# Patient Record
Sex: MALE | Race: WHITE | Employment: UNEMPLOYED | URBAN - NONMETROPOLITAN AREA
[De-identification: names, ages, dates, MRNs, and addresses within clinical notes are randomized per-mention and may not be internally consistent; named-entity substitution may affect disease eponyms.]

---

## 2019-08-07 ENCOUNTER — OFFICE VISIT (OUTPATIENT)
Dept: SURGERY | Age: 54
End: 2019-08-07
Payer: COMMERCIAL

## 2019-08-07 VITALS
SYSTOLIC BLOOD PRESSURE: 138 MMHG | RESPIRATION RATE: 20 BRPM | OXYGEN SATURATION: 99 % | HEIGHT: 74 IN | BODY MASS INDEX: 36.32 KG/M2 | HEART RATE: 60 BPM | WEIGHT: 283 LBS | TEMPERATURE: 96 F | DIASTOLIC BLOOD PRESSURE: 82 MMHG

## 2019-08-07 DIAGNOSIS — I10 ESSENTIAL HYPERTENSION: ICD-10-CM

## 2019-08-07 DIAGNOSIS — K42.9 UMBILICAL HERNIA WITHOUT OBSTRUCTION AND WITHOUT GANGRENE: Primary | ICD-10-CM

## 2019-08-07 DIAGNOSIS — Z01.818 PRE-OP TESTING: ICD-10-CM

## 2019-08-07 PROCEDURE — 3017F COLORECTAL CA SCREEN DOC REV: CPT | Performed by: SURGERY

## 2019-08-07 PROCEDURE — 99203 OFFICE O/P NEW LOW 30 MIN: CPT | Performed by: SURGERY

## 2019-08-07 PROCEDURE — G8417 CALC BMI ABV UP PARAM F/U: HCPCS | Performed by: SURGERY

## 2019-08-07 PROCEDURE — 1036F TOBACCO NON-USER: CPT | Performed by: SURGERY

## 2019-08-07 PROCEDURE — G8427 DOCREV CUR MEDS BY ELIG CLIN: HCPCS | Performed by: SURGERY

## 2019-08-07 RX ORDER — CITALOPRAM 20 MG/1
20 TABLET ORAL DAILY
COMMUNITY
Start: 2018-10-16

## 2019-08-07 RX ORDER — METFORMIN HYDROCHLORIDE 500 MG/1
500 TABLET, EXTENDED RELEASE ORAL DAILY
COMMUNITY
Start: 2018-10-29

## 2019-08-07 RX ORDER — ATORVASTATIN CALCIUM 20 MG/1
20 TABLET, FILM COATED ORAL NIGHTLY
COMMUNITY
Start: 2018-08-23

## 2019-08-07 RX ORDER — LISINOPRIL 5 MG/1
5 TABLET ORAL DAILY
COMMUNITY
Start: 2019-05-08

## 2019-08-08 ASSESSMENT — ENCOUNTER SYMPTOMS
ABDOMINAL PAIN: 0
ANAL BLEEDING: 0
NAUSEA: 1
PHOTOPHOBIA: 0
VOICE CHANGE: 0
EYE REDNESS: 0
EYE PAIN: 0
TROUBLE SWALLOWING: 1
STRIDOR: 0
CHEST TIGHTNESS: 0
APNEA: 0
CONSTIPATION: 0
WHEEZING: 0
RECTAL PAIN: 0
ALLERGIC/IMMUNOLOGIC NEGATIVE: 1
VOMITING: 0
SORE THROAT: 0
SINUS PRESSURE: 0
RHINORRHEA: 0
SHORTNESS OF BREATH: 0
BLOOD IN STOOL: 0
BACK PAIN: 1
EYE ITCHING: 0
DIARRHEA: 1
COLOR CHANGE: 0
EYE DISCHARGE: 0
ABDOMINAL DISTENTION: 0
CHOKING: 0
FACIAL SWELLING: 0
COUGH: 0

## 2019-08-21 NOTE — H&P
release tablet Take 500 mg by mouth daily        lisinopril (PRINIVIL;ZESTRIL) 5 MG tablet Take 5 mg by mouth daily          No current facility-administered medications for this visit.                   Allergies   Allergen Reactions    Bee Venom      Clindamycin/Lincomycin Other (See Comments)       dizzyness    Phenergan [Promethazine] Itching         Family History         Family History   Problem Relation Age of Onset    Diabetes Mother      Kidney Disease Mother      Diabetes Father      Kidney Disease Father      Colon Cancer Father      Diabetes Sister      Diabetes Sister              Social History               Socioeconomic History    Marital status:        Spouse name: Not on file    Number of children: Not on file    Years of education: Not on file    Highest education level: Not on file   Occupational History    Not on file   Social Needs    Financial resource strain: Not on file    Food insecurity:       Worry: Not on file       Inability: Not on file    Transportation needs:       Medical: Not on file       Non-medical: Not on file   Tobacco Use    Smoking status: Never Smoker    Smokeless tobacco: Never Used   Substance and Sexual Activity    Alcohol use:  Yes       Comment: occasionally    Drug use: Never    Sexual activity: Not on file   Lifestyle    Physical activity:       Days per week: Not on file       Minutes per session: Not on file    Stress: Not on file   Relationships    Social connections:       Talks on phone: Not on file       Gets together: Not on file       Attends [employer] service: Not on file       Active member of club or organization: Not on file       Attends meetings of clubs or organizations: Not on file       Relationship status: Not on file    Intimate partner violence:       Fear of current or ex partner: Not on file       Emotionally abused: Not on file       Physically abused: Not on file       Forced sexual activity: Not on file

## 2019-08-22 ENCOUNTER — ANESTHESIA EVENT (OUTPATIENT)
Dept: OPERATING ROOM | Age: 54
End: 2019-08-22
Payer: COMMERCIAL

## 2019-08-22 ENCOUNTER — HOSPITAL ENCOUNTER (OUTPATIENT)
Age: 54
Setting detail: OUTPATIENT SURGERY
Discharge: HOME | End: 2019-08-22
Attending: SURGERY | Admitting: SURGERY
Payer: COMMERCIAL

## 2019-08-22 ENCOUNTER — ANESTHESIA (OUTPATIENT)
Dept: OPERATING ROOM | Age: 54
End: 2019-08-22
Payer: COMMERCIAL

## 2019-08-22 VITALS
BODY MASS INDEX: 35.99 KG/M2 | TEMPERATURE: 97.2 F | HEIGHT: 74 IN | OXYGEN SATURATION: 95 % | HEART RATE: 79 BPM | SYSTOLIC BLOOD PRESSURE: 143 MMHG | DIASTOLIC BLOOD PRESSURE: 79 MMHG | WEIGHT: 280.4 LBS | RESPIRATION RATE: 15 BRPM

## 2019-08-22 VITALS
RESPIRATION RATE: 1 BRPM | SYSTOLIC BLOOD PRESSURE: 93 MMHG | OXYGEN SATURATION: 81 % | DIASTOLIC BLOOD PRESSURE: 50 MMHG

## 2019-08-22 DIAGNOSIS — K42.0 INCARCERATED UMBILICAL HERNIA: Primary | ICD-10-CM

## 2019-08-22 LAB
GLUCOSE BLD-MCNC: 108 MG/DL (ref 70–108)
GLUCOSE BLD-MCNC: 117 MG/DL (ref 70–108)
POC POTASSIUM, WHOLE BLOOD: 3.9 MEQ/L (ref 3.5–4.9)

## 2019-08-22 PROCEDURE — 82948 REAGENT STRIP/BLOOD GLUCOSE: CPT

## 2019-08-22 PROCEDURE — 2500000003 HC RX 250 WO HCPCS: Performed by: SURGERY

## 2019-08-22 PROCEDURE — C1781 MESH (IMPLANTABLE): HCPCS | Performed by: SURGERY

## 2019-08-22 PROCEDURE — 7100000010 HC PHASE II RECOVERY - FIRST 15 MIN: Performed by: SURGERY

## 2019-08-22 PROCEDURE — 7100000000 HC PACU RECOVERY - FIRST 15 MIN: Performed by: SURGERY

## 2019-08-22 PROCEDURE — 2500000003 HC RX 250 WO HCPCS: Performed by: NURSE ANESTHETIST, CERTIFIED REGISTERED

## 2019-08-22 PROCEDURE — 84132 ASSAY OF SERUM POTASSIUM: CPT

## 2019-08-22 PROCEDURE — 3700000000 HC ANESTHESIA ATTENDED CARE: Performed by: SURGERY

## 2019-08-22 PROCEDURE — 7100000001 HC PACU RECOVERY - ADDTL 15 MIN: Performed by: SURGERY

## 2019-08-22 PROCEDURE — 3700000001 HC ADD 15 MINUTES (ANESTHESIA): Performed by: SURGERY

## 2019-08-22 PROCEDURE — 6360000002 HC RX W HCPCS: Performed by: ANESTHESIOLOGY

## 2019-08-22 PROCEDURE — 2709999900 HC NON-CHARGEABLE SUPPLY: Performed by: SURGERY

## 2019-08-22 PROCEDURE — 6360000002 HC RX W HCPCS: Performed by: SURGERY

## 2019-08-22 PROCEDURE — 3600000012 HC SURGERY LEVEL 2 ADDTL 15MIN: Performed by: SURGERY

## 2019-08-22 PROCEDURE — 6360000002 HC RX W HCPCS: Performed by: NURSE ANESTHETIST, CERTIFIED REGISTERED

## 2019-08-22 PROCEDURE — 3600000002 HC SURGERY LEVEL 2 BASE: Performed by: SURGERY

## 2019-08-22 PROCEDURE — 49587 REPAIR UMBILICAL HERN,5+Y/O,STRANG: CPT | Performed by: SURGERY

## 2019-08-22 PROCEDURE — 2580000003 HC RX 258: Performed by: SURGERY

## 2019-08-22 PROCEDURE — 7100000011 HC PHASE II RECOVERY - ADDTL 15 MIN: Performed by: SURGERY

## 2019-08-22 DEVICE — IMPLANTABLE DEVICE: Type: IMPLANTABLE DEVICE | Site: ABDOMEN | Status: FUNCTIONAL

## 2019-08-22 RX ORDER — SODIUM CHLORIDE 9 MG/ML
INJECTION, SOLUTION INTRAVENOUS CONTINUOUS
Status: DISCONTINUED | OUTPATIENT
Start: 2019-08-22 | End: 2019-08-22 | Stop reason: HOSPADM

## 2019-08-22 RX ORDER — OXYCODONE HYDROCHLORIDE 5 MG/1
10 TABLET ORAL EVERY 4 HOURS PRN
Status: DISCONTINUED | OUTPATIENT
Start: 2019-08-22 | End: 2019-08-22 | Stop reason: HOSPADM

## 2019-08-22 RX ORDER — FENTANYL CITRATE 50 UG/ML
50 INJECTION, SOLUTION INTRAMUSCULAR; INTRAVENOUS EVERY 5 MIN PRN
Status: DISCONTINUED | OUTPATIENT
Start: 2019-08-22 | End: 2019-08-22 | Stop reason: HOSPADM

## 2019-08-22 RX ORDER — ONDANSETRON 2 MG/ML
INJECTION INTRAMUSCULAR; INTRAVENOUS PRN
Status: DISCONTINUED | OUTPATIENT
Start: 2019-08-22 | End: 2019-08-22 | Stop reason: SDUPTHER

## 2019-08-22 RX ORDER — KETOROLAC TROMETHAMINE 10 MG/1
10 TABLET, FILM COATED ORAL EVERY 8 HOURS PRN
Qty: 15 TABLET | Refills: 0 | Status: SHIPPED | OUTPATIENT
Start: 2019-08-22

## 2019-08-22 RX ORDER — GLYCOPYRROLATE 1 MG/5 ML
SYRINGE (ML) INTRAVENOUS PRN
Status: DISCONTINUED | OUTPATIENT
Start: 2019-08-22 | End: 2019-08-22 | Stop reason: SDUPTHER

## 2019-08-22 RX ORDER — FENTANYL CITRATE 50 UG/ML
INJECTION, SOLUTION INTRAMUSCULAR; INTRAVENOUS PRN
Status: DISCONTINUED | OUTPATIENT
Start: 2019-08-22 | End: 2019-08-22 | Stop reason: SDUPTHER

## 2019-08-22 RX ORDER — LIDOCAINE HYDROCHLORIDE 20 MG/ML
INJECTION, SOLUTION EPIDURAL; INFILTRATION; INTRACAUDAL; PERINEURAL PRN
Status: DISCONTINUED | OUTPATIENT
Start: 2019-08-22 | End: 2019-08-22 | Stop reason: SDUPTHER

## 2019-08-22 RX ORDER — PROPOFOL 10 MG/ML
INJECTION, EMULSION INTRAVENOUS PRN
Status: DISCONTINUED | OUTPATIENT
Start: 2019-08-22 | End: 2019-08-22 | Stop reason: SDUPTHER

## 2019-08-22 RX ORDER — SODIUM CHLORIDE 0.9 % (FLUSH) 0.9 %
10 SYRINGE (ML) INJECTION EVERY 12 HOURS SCHEDULED
Status: DISCONTINUED | OUTPATIENT
Start: 2019-08-22 | End: 2019-08-22 | Stop reason: HOSPADM

## 2019-08-22 RX ORDER — MIDAZOLAM HYDROCHLORIDE 1 MG/ML
INJECTION INTRAMUSCULAR; INTRAVENOUS PRN
Status: DISCONTINUED | OUTPATIENT
Start: 2019-08-22 | End: 2019-08-22 | Stop reason: SDUPTHER

## 2019-08-22 RX ORDER — DEXAMETHASONE SODIUM PHOSPHATE 4 MG/ML
INJECTION, SOLUTION INTRA-ARTICULAR; INTRALESIONAL; INTRAMUSCULAR; INTRAVENOUS; SOFT TISSUE PRN
Status: DISCONTINUED | OUTPATIENT
Start: 2019-08-22 | End: 2019-08-22 | Stop reason: SDUPTHER

## 2019-08-22 RX ORDER — CEFAZOLIN SODIUM 1 G/50ML
1 INJECTION, SOLUTION INTRAVENOUS
Status: COMPLETED | OUTPATIENT
Start: 2019-08-22 | End: 2019-08-22

## 2019-08-22 RX ORDER — MORPHINE SULFATE 2 MG/ML
4 INJECTION, SOLUTION INTRAMUSCULAR; INTRAVENOUS
Status: DISCONTINUED | OUTPATIENT
Start: 2019-08-22 | End: 2019-08-22 | Stop reason: HOSPADM

## 2019-08-22 RX ORDER — OXYCODONE HYDROCHLORIDE 5 MG/1
5 TABLET ORAL EVERY 4 HOURS PRN
Status: DISCONTINUED | OUTPATIENT
Start: 2019-08-22 | End: 2019-08-22 | Stop reason: HOSPADM

## 2019-08-22 RX ORDER — MORPHINE SULFATE 2 MG/ML
2 INJECTION, SOLUTION INTRAMUSCULAR; INTRAVENOUS
Status: DISCONTINUED | OUTPATIENT
Start: 2019-08-22 | End: 2019-08-22 | Stop reason: HOSPADM

## 2019-08-22 RX ORDER — LABETALOL 20 MG/4 ML (5 MG/ML) INTRAVENOUS SYRINGE
5 EVERY 10 MIN PRN
Status: DISCONTINUED | OUTPATIENT
Start: 2019-08-22 | End: 2019-08-22 | Stop reason: HOSPADM

## 2019-08-22 RX ORDER — MEPERIDINE HYDROCHLORIDE 25 MG/ML
12.5 INJECTION INTRAMUSCULAR; INTRAVENOUS; SUBCUTANEOUS EVERY 5 MIN PRN
Status: DISCONTINUED | OUTPATIENT
Start: 2019-08-22 | End: 2019-08-22 | Stop reason: HOSPADM

## 2019-08-22 RX ORDER — BUPIVACAINE HYDROCHLORIDE 5 MG/ML
INJECTION, SOLUTION EPIDURAL; INTRACAUDAL PRN
Status: DISCONTINUED | OUTPATIENT
Start: 2019-08-22 | End: 2019-08-22 | Stop reason: ALTCHOICE

## 2019-08-22 RX ORDER — ONDANSETRON 2 MG/ML
4 INJECTION INTRAMUSCULAR; INTRAVENOUS
Status: DISCONTINUED | OUTPATIENT
Start: 2019-08-22 | End: 2019-08-22 | Stop reason: HOSPADM

## 2019-08-22 RX ORDER — ONDANSETRON 2 MG/ML
4 INJECTION INTRAMUSCULAR; INTRAVENOUS EVERY 6 HOURS PRN
Status: DISCONTINUED | OUTPATIENT
Start: 2019-08-22 | End: 2019-08-22 | Stop reason: HOSPADM

## 2019-08-22 RX ORDER — KETOROLAC TROMETHAMINE 30 MG/ML
INJECTION, SOLUTION INTRAMUSCULAR; INTRAVENOUS PRN
Status: DISCONTINUED | OUTPATIENT
Start: 2019-08-22 | End: 2019-08-22 | Stop reason: SDUPTHER

## 2019-08-22 RX ORDER — HYDROCODONE BITARTRATE AND ACETAMINOPHEN 5; 325 MG/1; MG/1
1-2 TABLET ORAL EVERY 6 HOURS PRN
Qty: 25 TABLET | Refills: 0 | Status: SHIPPED | OUTPATIENT
Start: 2019-08-22 | End: 2019-08-25

## 2019-08-22 RX ORDER — SODIUM CHLORIDE 0.9 % (FLUSH) 0.9 %
10 SYRINGE (ML) INJECTION PRN
Status: DISCONTINUED | OUTPATIENT
Start: 2019-08-22 | End: 2019-08-22 | Stop reason: HOSPADM

## 2019-08-22 RX ADMIN — PROPOFOL 200 MG: 10 INJECTION, EMULSION INTRAVENOUS at 09:01

## 2019-08-22 RX ADMIN — HYDROMORPHONE HYDROCHLORIDE 0.25 MG: 1 INJECTION, SOLUTION INTRAMUSCULAR; INTRAVENOUS; SUBCUTANEOUS at 10:13

## 2019-08-22 RX ADMIN — FENTANYL CITRATE 50 MCG: 50 INJECTION INTRAMUSCULAR; INTRAVENOUS at 09:16

## 2019-08-22 RX ADMIN — LIDOCAINE HYDROCHLORIDE 100 MG: 20 INJECTION, SOLUTION EPIDURAL; INFILTRATION; INTRACAUDAL; PERINEURAL at 09:01

## 2019-08-22 RX ADMIN — CEFAZOLIN SODIUM 2 G: 1 INJECTION, SOLUTION INTRAVENOUS at 09:09

## 2019-08-22 RX ADMIN — MIDAZOLAM HYDROCHLORIDE 2 MG: 1 INJECTION, SOLUTION INTRAMUSCULAR; INTRAVENOUS at 08:59

## 2019-08-22 RX ADMIN — ONDANSETRON HYDROCHLORIDE 4 MG: 4 INJECTION, SOLUTION INTRAMUSCULAR; INTRAVENOUS at 09:01

## 2019-08-22 RX ADMIN — HYDROMORPHONE HYDROCHLORIDE 0.25 MG: 1 INJECTION, SOLUTION INTRAMUSCULAR; INTRAVENOUS; SUBCUTANEOUS at 10:07

## 2019-08-22 RX ADMIN — FENTANYL CITRATE 50 MCG: 50 INJECTION INTRAMUSCULAR; INTRAVENOUS at 09:27

## 2019-08-22 RX ADMIN — Medication 0.2 MG: at 09:01

## 2019-08-22 RX ADMIN — KETOROLAC TROMETHAMINE 30 MG: 30 INJECTION, SOLUTION INTRAMUSCULAR; INTRAVENOUS at 09:30

## 2019-08-22 RX ADMIN — SODIUM CHLORIDE: 9 INJECTION, SOLUTION INTRAVENOUS at 08:59

## 2019-08-22 RX ADMIN — FENTANYL CITRATE 100 MCG: 50 INJECTION INTRAMUSCULAR; INTRAVENOUS at 09:01

## 2019-08-22 RX ADMIN — DEXAMETHASONE SODIUM PHOSPHATE 10 MG: 4 INJECTION, SOLUTION INTRAMUSCULAR; INTRAVENOUS at 09:01

## 2019-08-22 ASSESSMENT — PULMONARY FUNCTION TESTS
PIF_VALUE: 2
PIF_VALUE: 4
PIF_VALUE: 2
PIF_VALUE: 15
PIF_VALUE: 16
PIF_VALUE: 4
PIF_VALUE: 3
PIF_VALUE: 2
PIF_VALUE: 14
PIF_VALUE: 3
PIF_VALUE: 15
PIF_VALUE: 15
PIF_VALUE: 2
PIF_VALUE: 2
PIF_VALUE: 1
PIF_VALUE: 2
PIF_VALUE: 2
PIF_VALUE: 3
PIF_VALUE: 1
PIF_VALUE: 1
PIF_VALUE: 16
PIF_VALUE: 3
PIF_VALUE: 16
PIF_VALUE: 32
PIF_VALUE: 15
PIF_VALUE: 2
PIF_VALUE: 1
PIF_VALUE: 2
PIF_VALUE: 1
PIF_VALUE: 4
PIF_VALUE: 16
PIF_VALUE: 32
PIF_VALUE: 2
PIF_VALUE: 15
PIF_VALUE: 2
PIF_VALUE: 2
PIF_VALUE: 15
PIF_VALUE: 2
PIF_VALUE: 2
PIF_VALUE: 3
PIF_VALUE: 2
PIF_VALUE: 2
PIF_VALUE: 1
PIF_VALUE: 1
PIF_VALUE: 2
PIF_VALUE: 2

## 2019-08-22 ASSESSMENT — PAIN - FUNCTIONAL ASSESSMENT: PAIN_FUNCTIONAL_ASSESSMENT: 0-10

## 2019-08-22 ASSESSMENT — PAIN SCALES - GENERAL
PAINLEVEL_OUTOF10: 5
PAINLEVEL_OUTOF10: 5

## 2019-08-22 ASSESSMENT — PAIN SCALES - WONG BAKER: WONGBAKER_NUMERICALRESPONSE: 0

## 2019-08-22 NOTE — BRIEF OP NOTE
Brief Postoperative Note  ______________________________________________________________    Patient: Prosper Phipps  YOB: 1965  MRN: [id number]  Date of Procedure: 8/22/2019    Pre-Op Diagnosis: INCARCERATED UMBILICAL HERNIA    Post-Op Diagnosis: Same       Procedure(s):  INCARCERATED UMBILICAL HERNIA REPAIR WITH MESH    Anesthesia: general/local    Surgeon(s):  Allison Saunders MD    Assistant: none    Estimated Blood Loss (mL): 10 ml    Complications: None    Specimens:   * No specimens in log *    Implants:  Implant Name Type Inv.  Item Serial No.  Lot No. LRB No. Used   PATCH JCARLOS VENTRALEX ST W/STRAP CIR SM 4.3CM Mesh PATCH JCARLOS VENTRALEX ST W/STRAP CIR SM 4.3CM  CR [employer] [id number] N/A 1         Drains: * No LDAs found *    Findings: as above    Allison Saunders MD  Date: 8/22/2019  Time: 9:47 AM

## 2019-08-22 NOTE — INTERVAL H&P NOTE
H&P Update    Patient's History and Physical from August was reviewed. Patient examined. There has been no change.     Electronically signed by Silver Mcintyre MD on 8/22/19 at 9:47 AM

## 2019-08-23 ENCOUNTER — TELEPHONE (OUTPATIENT)
Dept: SURGERY | Age: 54
End: 2019-08-23

## 2019-08-23 NOTE — TELEPHONE ENCOUNTER
Called to check on patient post op. NA, LM advising patient to give the office a call with any questions or concerns

## 2019-08-23 NOTE — OP NOTE
[address] [place] [address]                                OPERATIVE REPORT    PATIENT NAME: Aislinn Zapata                   :        1965  MED REC NO:   [id number]                           ROOM:  ACCOUNT NO:   [de-identified]                           ADMIT DATE: 2019  PROVIDER:     David Santana M.D.    Eyad Manriquez OF PROCEDURE:  2019    PREOPERATIVE DIAGNOSIS:  Incarcerated umbilical hernia. POSTOPERATIVE DIAGNOSIS:  Incarcerated umbilical hernia. PROCEDURE:  Repair of incarcerated umbilical hernia with mesh (small  Ventralex mesh). SURGEON:  David Santana MD.    ANESTHESIA:  General/local.    ESTIMATED BLOOD LOSS:  10 mL. DRAINS:  None. COMPLICATIONS:  None. DISPOSITION:  Stable to the recovery room. INDICATIONS:  The patient is a 14-year-old gentleman who I had seen in  the office secondary to periumbilical bulge with discomfort. Found to  have an incarcerated umbilical hernia. Both operative and nonoperative  intervention plans were discussed. He understood and wished to proceed  with surgical intervention. The risks of surgery were then further  discussed. Some of the risks included, but were not limited to  bleeding, infection, the need for reoperation, severe chronic  postoperative pain or numbness, major vascular or nerve injury,  cardiopulmonary complications, anesthetic complications, seroma/hematoma  formation, wound breakdown, trocar site herniation, mesh infection  requiring the removal of the mesh, recurrence of the hernia, chronic  groin pain, and death. After all of the questions were answered in  their entirety and the patient was completely aware of the current  situation, he elected to proceed with the procedure.     DESCRIPTION OF THE PROCEDURE:  After informed consent was signed and  placed on the chart, the patient was taken back to the operating room  and placed supine on

## 2019-09-09 ENCOUNTER — OFFICE VISIT (OUTPATIENT)
Dept: SURGERY | Age: 54
End: 2019-09-09

## 2019-09-09 VITALS
OXYGEN SATURATION: 98 % | HEIGHT: 74 IN | WEIGHT: 280 LBS | TEMPERATURE: 96.5 F | HEART RATE: 62 BPM | RESPIRATION RATE: 18 BRPM | DIASTOLIC BLOOD PRESSURE: 80 MMHG | SYSTOLIC BLOOD PRESSURE: 142 MMHG | BODY MASS INDEX: 35.94 KG/M2

## 2019-09-09 DIAGNOSIS — Z09 S/P UMBILICAL HERNIA REPAIR, FOLLOW-UP EXAM: Primary | ICD-10-CM

## 2019-09-09 PROCEDURE — 99024 POSTOP FOLLOW-UP VISIT: CPT | Performed by: NURSE PRACTITIONER

## 2019-09-09 ASSESSMENT — ENCOUNTER SYMPTOMS
EYE DISCHARGE: 0
CHEST TIGHTNESS: 0
COLOR CHANGE: 0
ANAL BLEEDING: 0
CONSTIPATION: 0
BLOOD IN STOOL: 0
BACK PAIN: 0
STRIDOR: 0
EYE ITCHING: 0
APNEA: 0
COUGH: 0
SORE THROAT: 0
ABDOMINAL PAIN: 0
DIARRHEA: 0
VOMITING: 0
ABDOMINAL DISTENTION: 0
WHEEZING: 0
FACIAL SWELLING: 0
SINUS PRESSURE: 0
EYE PAIN: 0
RECTAL PAIN: 0
TROUBLE SWALLOWING: 0
CHOKING: 0
PHOTOPHOBIA: 0
VOICE CHANGE: 0
EYE REDNESS: 0
SHORTNESS OF BREATH: 0
RHINORRHEA: 0

## 2019-09-09 NOTE — PROGRESS NOTES
[address] [facility] [address] [facility] [address]  Dept: [phone]  Dept Fax: [phone]  Loc: [phone]    Visit Date: 9/9/2019    Valentina Ibarra is a 46 y.o. male who presents today for:  Chief Complaint   Patient presents with    Post-Op Check     s/p Repair of incarcerated umbilical hernia with mesh 8/22/19       HPI:     HPI    Ashlyn Menchaca is a 54-year old male patient who presents today for follow up status post repair of umbilical hernia with mesh 2 weeks ago with Dr. Selma Singh. Off all narcotics. Denies any abdominal/surgical pain. Appetite back to normal. No nausea or vomiting. No fever, chills, or sweats. Incision healing well without any signs of infection. A little tender. Normal induration. No issues urinating. Bowels are back to normal. No stool softener use. No SOB or chest pain. No lightheadedness or dizziness. Tolerating activity well.     Past Medical History:   Diagnosis Date    Diabetes mellitus ([address]     BORDERLINE    Hernia of abdominal wall     Hypercholesteremia     Hypertension     RADHA on CPAP     PONV (postoperative nausea and vomiting)     Pre-diabetes     Sleep apnea     wears cpap      Past Surgical History:   Procedure Laterality Date    [place]    BACK SURGERY  02[facility]2019    [facility]   Tivis Abelson CERVICAL SPINE SURGERY  05/2017    [place]    BERTRAND Andrade  [address] [place] [address]    COLONOSCOPY      due in 2020   [address] SURGERY  12/2015    [place]    [facility]    OTHER SURGICAL HISTORY Right 04/2018    I&D rotator cuff x2, [place]    [facility]    ROTATOR CUFF REPAIR Right 03/2018    [facility]    UMBILICAL HERNIA REPAIR N/A 2/06/3580    UMBILICAL HERNIA REPAIR POSS MESH performed by Herbert Fernandez MD at [address]       Family History   Problem Relation Age of Onset  Diabetes Mother     Kidney Disease Mother     Diabetes Father     Kidney Disease Father     Colon Cancer Father     Diabetes Sister     Diabetes Sister        Social History     Tobacco Use    Smoking status: Never Smoker    Smokeless tobacco: Never Used   Substance Use Topics    Alcohol use: Yes     Comment: occasionally      Current Outpatient Medications   Medication Sig Dispense Refill    ketorolac (TORADOL) 10 MG tablet Take 1 tablet by mouth every 8 hours as needed for Pain 15 tablet 0    citalopram (CELEXA) 20 MG tablet Take 20 mg by mouth daily      atorvastatin (LIPITOR) 20 MG tablet Take 20 mg by mouth nightly      metFORMIN (GLUCOPHAGE-XR) 500 MG extended release tablet Take 500 mg by mouth daily      lisinopril (PRINIVIL;ZESTRIL) 5 MG tablet Take 5 mg by mouth daily       No current facility-administered medications for this visit. Allergies   Allergen Reactions    Bee Venom     Clindamycin/Lincomycin Other (See Comments)     dizzyness    Phenergan [Promethazine] Itching       Subjective:     Review of Systems   Constitutional: Negative for activity change, appetite change, chills, diaphoresis, fatigue, fever and unexpected weight change. HENT: Negative for congestion, dental problem, drooling, ear discharge, ear pain, facial swelling, hearing loss, mouth sores, nosebleeds, postnasal drip, rhinorrhea, sinus pressure, sneezing, sore throat, tinnitus, trouble swallowing and voice change. Eyes: Negative for photophobia, pain, discharge, redness, itching and visual disturbance. Respiratory: Negative for apnea, cough, choking, chest tightness, shortness of breath, wheezing and stridor. Cardiovascular: Negative for chest pain, palpitations and leg swelling. Gastrointestinal: Negative for abdominal distention, abdominal pain, anal bleeding, blood in stool, constipation, diarrhea, nausea, rectal pain and vomiting.    Genitourinary: Negative for decreased urine volume,

## 2019-09-10 ASSESSMENT — ENCOUNTER SYMPTOMS: NAUSEA: 0

## 2023-01-06 ENCOUNTER — HOSPITAL ENCOUNTER (OUTPATIENT)
Dept: CARDIAC CATH/INVASIVE PROCEDURES | Age: 58
Setting detail: OBSERVATION
Discharge: HOME | End: 2023-01-07
Attending: INTERNAL MEDICINE | Admitting: INTERNAL MEDICINE
Payer: MEDICARE

## 2023-01-06 PROBLEM — Z98.62 STATUS POST ANGIOPLASTY: Status: ACTIVE | Noted: 2023-01-06

## 2023-01-06 LAB
ACTIVATED CLOTTING TIME, LOW RANGE: 264 SEC
ACTIVATED CLOTTING TIME, LOW RANGE: 355 SEC
ALBUMIN SERPL-MCNC: 4.1 GM/DL (ref 3.4–5)
ALP BLD-CCNC: 101 IU/L (ref 40–129)
ALT SERPL-CCNC: 24 U/L (ref 10–40)
ANION GAP SERPL CALCULATED.3IONS-SCNC: 6 MMOL/L (ref 4–16)
APTT: 34.3 SECONDS (ref 25.1–37.1)
AST SERPL-CCNC: 18 IU/L (ref 15–37)
BILIRUB SERPL-MCNC: 0.2 MG/DL (ref 0–1)
BUN BLDV-MCNC: 17 MG/DL (ref 6–23)
CALCIUM SERPL-MCNC: 8.9 MG/DL (ref 8.3–10.6)
CHLORIDE BLD-SCNC: 107 MMOL/L (ref 99–110)
CO2: 27 MMOL/L (ref 21–32)
CREAT SERPL-MCNC: 0.7 MG/DL (ref 0.9–1.3)
ESTIMATED AVERAGE GLUCOSE: 148 MG/DL
GFR SERPL CREATININE-BSD FRML MDRD: >60 ML/MIN/1.73M2
GLUCOSE BLD-MCNC: 100 MG/DL (ref 70–99)
GLUCOSE BLD-MCNC: 125 MG/DL (ref 70–99)
GLUCOSE BLD-MCNC: 129 MG/DL (ref 70–99)
GLUCOSE BLD-MCNC: 97 MG/DL (ref 70–99)
HBA1C MFR BLD: 6.8 % (ref 4.2–6.3)
HCT VFR BLD CALC: 43 % (ref 42–52)
HEMOGLOBIN: 14.4 GM/DL (ref 13.5–18)
INR BLD: 0.88 INDEX
MCH RBC QN AUTO: 29.9 PG (ref 27–31)
MCHC RBC AUTO-ENTMCNC: 33.5 % (ref 32–36)
MCV RBC AUTO: 89.4 FL (ref 78–100)
PDW BLD-RTO: 13.8 % (ref 11.7–14.9)
PLATELET # BLD: 311 K/CU MM (ref 140–440)
PMV BLD AUTO: 10 FL (ref 7.5–11.1)
POTASSIUM SERPL-SCNC: 4.6 MMOL/L (ref 3.5–5.1)
PROTHROMBIN TIME: 11.4 SECONDS (ref 11.7–14.5)
RBC # BLD: 4.81 M/CU MM (ref 4.6–6.2)
SODIUM BLD-SCNC: 140 MMOL/L (ref 135–145)
TOTAL PROTEIN: 7.1 GM/DL (ref 6.4–8.2)
WBC # BLD: 6.5 K/CU MM (ref 4–10.5)

## 2023-01-06 PROCEDURE — 6370000000 HC RX 637 (ALT 250 FOR IP)

## 2023-01-06 PROCEDURE — 2580000003 HC RX 258: Performed by: INTERNAL MEDICINE

## 2023-01-06 PROCEDURE — 80053 COMPREHEN METABOLIC PANEL: CPT

## 2023-01-06 PROCEDURE — 94660 CPAP INITIATION&MGMT: CPT

## 2023-01-06 PROCEDURE — C1725 CATH, TRANSLUMIN NON-LASER: HCPCS

## 2023-01-06 PROCEDURE — 2709999900 HC NON-CHARGEABLE SUPPLY

## 2023-01-06 PROCEDURE — 36415 COLL VENOUS BLD VENIPUNCTURE: CPT

## 2023-01-06 PROCEDURE — C1769 GUIDE WIRE: HCPCS

## 2023-01-06 PROCEDURE — G0378 HOSPITAL OBSERVATION PER HR: HCPCS

## 2023-01-06 PROCEDURE — 2500000003 HC RX 250 WO HCPCS

## 2023-01-06 PROCEDURE — 85610 PROTHROMBIN TIME: CPT

## 2023-01-06 PROCEDURE — 85730 THROMBOPLASTIN TIME PARTIAL: CPT

## 2023-01-06 PROCEDURE — 93458 L HRT ARTERY/VENTRICLE ANGIO: CPT

## 2023-01-06 PROCEDURE — 92928 PRQ TCAT PLMT NTRAC ST 1 LES: CPT

## 2023-01-06 PROCEDURE — 85027 COMPLETE CBC AUTOMATED: CPT

## 2023-01-06 PROCEDURE — 6370000000 HC RX 637 (ALT 250 FOR IP): Performed by: INTERNAL MEDICINE

## 2023-01-06 PROCEDURE — C1887 CATHETER, GUIDING: HCPCS

## 2023-01-06 PROCEDURE — C1894 INTRO/SHEATH, NON-LASER: HCPCS

## 2023-01-06 PROCEDURE — 6360000004 HC RX CONTRAST MEDICATION

## 2023-01-06 PROCEDURE — 83036 HEMOGLOBIN GLYCOSYLATED A1C: CPT

## 2023-01-06 PROCEDURE — 85347 COAGULATION TIME ACTIVATED: CPT

## 2023-01-06 PROCEDURE — 93005 ELECTROCARDIOGRAM TRACING: CPT | Performed by: INTERNAL MEDICINE

## 2023-01-06 PROCEDURE — C1874 STENT, COATED/COV W/DEL SYS: HCPCS

## 2023-01-06 PROCEDURE — 6360000002 HC RX W HCPCS

## 2023-01-06 PROCEDURE — 82962 GLUCOSE BLOOD TEST: CPT

## 2023-01-06 RX ORDER — DEXTROSE MONOHYDRATE 100 MG/ML
INJECTION, SOLUTION INTRAVENOUS CONTINUOUS PRN
Status: DISCONTINUED | OUTPATIENT
Start: 2023-01-06 | End: 2023-01-07 | Stop reason: HOSPADM

## 2023-01-06 RX ORDER — CITALOPRAM 20 MG/1
20 TABLET ORAL DAILY
Status: DISCONTINUED | OUTPATIENT
Start: 2023-01-06 | End: 2023-01-07 | Stop reason: HOSPADM

## 2023-01-06 RX ORDER — SODIUM CHLORIDE 0.9 % (FLUSH) 0.9 %
5-40 SYRINGE (ML) INJECTION EVERY 12 HOURS SCHEDULED
Status: DISCONTINUED | OUTPATIENT
Start: 2023-01-06 | End: 2023-01-07 | Stop reason: HOSPADM

## 2023-01-06 RX ORDER — MORPHINE SULFATE 2 MG/ML
1 INJECTION, SOLUTION INTRAMUSCULAR; INTRAVENOUS
Status: DISCONTINUED | OUTPATIENT
Start: 2023-01-06 | End: 2023-01-07 | Stop reason: HOSPADM

## 2023-01-06 RX ORDER — RANOLAZINE 500 MG/1
500 TABLET, EXTENDED RELEASE ORAL 2 TIMES DAILY
Status: DISCONTINUED | OUTPATIENT
Start: 2023-01-06 | End: 2023-01-07 | Stop reason: HOSPADM

## 2023-01-06 RX ORDER — ATROPINE SULFATE 0.4 MG/ML
0.5 AMPUL (ML) INJECTION
Status: DISCONTINUED | OUTPATIENT
Start: 2023-01-06 | End: 2023-01-07 | Stop reason: HOSPADM

## 2023-01-06 RX ORDER — ASPIRIN 81 MG/1
81 TABLET, CHEWABLE ORAL DAILY
Status: DISCONTINUED | OUTPATIENT
Start: 2023-01-06 | End: 2023-01-06 | Stop reason: SDUPTHER

## 2023-01-06 RX ORDER — ACETAMINOPHEN 325 MG/1
650 TABLET ORAL EVERY 4 HOURS PRN
Status: DISCONTINUED | OUTPATIENT
Start: 2023-01-06 | End: 2023-01-07 | Stop reason: HOSPADM

## 2023-01-06 RX ORDER — CLOPIDOGREL BISULFATE 75 MG/1
75 TABLET ORAL DAILY
Status: DISCONTINUED | OUTPATIENT
Start: 2023-01-06 | End: 2023-01-06 | Stop reason: SDUPTHER

## 2023-01-06 RX ORDER — SODIUM CHLORIDE 9 MG/ML
INJECTION, SOLUTION INTRAVENOUS CONTINUOUS
Status: DISCONTINUED | OUTPATIENT
Start: 2023-01-06 | End: 2023-01-06

## 2023-01-06 RX ORDER — DIPHENHYDRAMINE HCL 25 MG
25 TABLET ORAL ONCE
Status: COMPLETED | OUTPATIENT
Start: 2023-01-06 | End: 2023-01-06

## 2023-01-06 RX ORDER — INSULIN LISPRO 100 [IU]/ML
0-4 INJECTION, SOLUTION INTRAVENOUS; SUBCUTANEOUS
Status: DISCONTINUED | OUTPATIENT
Start: 2023-01-06 | End: 2023-01-07 | Stop reason: HOSPADM

## 2023-01-06 RX ORDER — SODIUM CHLORIDE 9 MG/ML
INJECTION, SOLUTION INTRAVENOUS PRN
Status: DISCONTINUED | OUTPATIENT
Start: 2023-01-06 | End: 2023-01-07 | Stop reason: HOSPADM

## 2023-01-06 RX ORDER — ATORVASTATIN CALCIUM 40 MG/1
80 TABLET, FILM COATED ORAL NIGHTLY
Status: DISCONTINUED | OUTPATIENT
Start: 2023-01-06 | End: 2023-01-07 | Stop reason: HOSPADM

## 2023-01-06 RX ORDER — SODIUM CHLORIDE 0.9 % (FLUSH) 0.9 %
5-40 SYRINGE (ML) INJECTION PRN
Status: DISCONTINUED | OUTPATIENT
Start: 2023-01-06 | End: 2023-01-07 | Stop reason: HOSPADM

## 2023-01-06 RX ORDER — ASPIRIN 81 MG/1
81 TABLET, CHEWABLE ORAL DAILY
Status: DISCONTINUED | OUTPATIENT
Start: 2023-01-07 | End: 2023-01-07 | Stop reason: HOSPADM

## 2023-01-06 RX ORDER — SODIUM CHLORIDE 9 MG/ML
INJECTION, SOLUTION INTRAVENOUS CONTINUOUS
Status: DISCONTINUED | OUTPATIENT
Start: 2023-01-06 | End: 2023-01-07 | Stop reason: HOSPADM

## 2023-01-06 RX ORDER — LOSARTAN POTASSIUM 25 MG/1
50 TABLET ORAL DAILY
Status: DISCONTINUED | OUTPATIENT
Start: 2023-01-06 | End: 2023-01-07 | Stop reason: HOSPADM

## 2023-01-06 RX ORDER — CLOPIDOGREL BISULFATE 75 MG/1
75 TABLET ORAL DAILY
Status: DISCONTINUED | OUTPATIENT
Start: 2023-01-07 | End: 2023-01-07 | Stop reason: HOSPADM

## 2023-01-06 RX ORDER — CARVEDILOL 6.25 MG/1
6.25 TABLET ORAL 2 TIMES DAILY WITH MEALS
Status: DISCONTINUED | OUTPATIENT
Start: 2023-01-06 | End: 2023-01-07 | Stop reason: HOSPADM

## 2023-01-06 RX ORDER — DIAZEPAM 5 MG/1
5 TABLET ORAL ONCE
Status: COMPLETED | OUTPATIENT
Start: 2023-01-06 | End: 2023-01-06

## 2023-01-06 RX ORDER — LOSARTAN POTASSIUM 25 MG/1
25 TABLET ORAL DAILY
Status: ON HOLD | COMMUNITY
End: 2023-01-07 | Stop reason: HOSPADM

## 2023-01-06 RX ADMIN — SODIUM CHLORIDE: 9 INJECTION, SOLUTION INTRAVENOUS at 10:34

## 2023-01-06 RX ADMIN — LOSARTAN POTASSIUM 50 MG: 25 TABLET, FILM COATED ORAL at 15:33

## 2023-01-06 RX ADMIN — RANOLAZINE 500 MG: 500 TABLET, EXTENDED RELEASE ORAL at 20:06

## 2023-01-06 RX ADMIN — SODIUM CHLORIDE: 9 INJECTION, SOLUTION INTRAVENOUS at 14:56

## 2023-01-06 RX ADMIN — EMPAGLIFLOZIN 10 MG: 10 TABLET, FILM COATED ORAL at 17:05

## 2023-01-06 RX ADMIN — DIPHENHYDRAMINE HYDROCHLORIDE 25 MG: 25 TABLET ORAL at 10:34

## 2023-01-06 RX ADMIN — DIAZEPAM 5 MG: 5 TABLET ORAL at 10:34

## 2023-01-06 RX ADMIN — ATORVASTATIN CALCIUM 80 MG: 40 TABLET, FILM COATED ORAL at 20:06

## 2023-01-06 RX ADMIN — CARVEDILOL 6.25 MG: 6.25 TABLET, FILM COATED ORAL at 15:33

## 2023-01-06 RX ADMIN — CITALOPRAM HYDROBROMIDE 20 MG: 20 TABLET ORAL at 15:33

## 2023-01-06 NOTE — PROCEDURES
[address] [place] [address], [address] [place] [address]                            CARDIAC CATHETERIZATION    PATIENT NAME: Kelly Dennis                   :        1965  MED REC NO:   [id number]                          ROOM:  ACCOUNT NO:   [de-identified]                           ADMIT DATE: 2023  PROVIDER:     Ricardo Moran MD    DATE OF PROCEDURE:  2023    INDICATION:  Nonsustained ventricular tachycardia, chest pain, and  coronary artery disease. PROCEDURE:  This is a 48-year-old male patient was brought to the cath  lab today. Informed consent was obtained from the patient. The patient  was prepped and draped in the usual sterile fashion. The patient was  injected with 5 mL of 2% lidocaine in the right radial region. Using a  radial needle, the right radial artery was cannulized and a 5/6-Icelandic  sheath was placed in the right radial artery. The entire procedure was  done using guidewire. The sheath was flushed in between procedure. Using a TIG catheter, left coronary angiography was performed. The left  coronary angiogram revealed the left main is long and patent. It  trifurcates into the LAD, circ, and ramus branch. Circ is a small-sized vessel and patent. Ramus is a medium-sized vessel It reaches and wraps the apex and it is  branching vessel. The ramus has also 80% stenosis noted followed by  another 80% stenosis in the mid segment. It has a sub-branch present  that also 80% stenosis noted. LAD has an ostial 80% stenosis noted followed by proximal 80% stenosis. The LAD reaches and wraps the apex. Right coronary artery is a large-sized vessel. JR-4 catheter was used. It is a dominant vessel. It is an ectatic vessel. Right coronary  artery has distal 50% stenosis noted. PDA and PL branch has mild  disease noted. EDP is around 15 mmHg.   On the pullback, there is no gradient across the  aortic valve.    IMPRESSION:  1. EDP is around 15 mmHg present. On the pullback, there is no  gradient present. Across AV valve   2. Right coronary artery is a large-sized vessel. It is a dominant  vessel.-Distal 50% stenosis noted. It is a dominant vessel. 3. Left main is patent. 4..  Circ is small sized vessel and it is patent. 6.  Ramus has 80% ostial stenosis noted followed by distal 80% stenosis  noted. 7. LAD has proximal 80% followed by mid 80% stenosis noted. The plan is to proceed with the intervention of the LAD and the ramus  branch. The patient was anticoagulated with heparin. ACT was kept greater than  250. The patient received Plavix 600 mg postprocedure and aspirin 325  mg postprocedure. Then, using a EBU-4 guide, left main was engaged. A Runthrough wire was  placed into the OM branch and a BMW wire was placed in the LAD. The  patient had another superior branch of the ramus with a large flow was  placed. The superior branch of the ramus was ballooned with a 2.5 x 15  mm balloon . The superior branch was ballooned and the lesion was  decreased from 80% down to 10% and the ramus lesion was stented with two  drug-eluting stents in the proximal and the distal segments both were  stented with drug-eluting stents, 2 x 10 stent was placed in the distal  segment and a 2.5 x 15 stent was placed in the proximal segment. Both  lesions decreased from 80% to 0% and it was placed right at the ostium. Multiple projections were taken and both lesions decreased to 0%. The  LAD was stented with a drug-eluting stent, Resolute, 2.5 x 30 mm stent. The stent was deployed at high pressure and then the stent was  post-dilated in the ostium with 3.5 x 12 mm noncompliant balloon. All  the lesions were decreased to 0%. There was a brisk flow noted. No  dissection, perforation, or distal embolization noted. IMPRESSION:  1.   Successful angioplasty of the ostial and mid-LAD lesion, decreased  from 80% to 0% with a drug-eluting stent, Resolute 2.5 x 30 mm stent. The stent was post-dilated with a 3.5 x 12 noncompliant balloon at the  ostium. 2.  Successful angioplasty of the ramus branch in the proximal and the  distal lesions. Both were stented with Resolute stents, 2.5 x 15 and  2.5 x 12 mm stents. 3.  Successful balloon angioplasty at the branch of the ramus, ostial  lesion with a balloon 2.5 x 15 mm balloon. The patient tolerated the procedure. No complications were noted. There was a MARCUS-3 flow noted. The patient will be admitted in the  hospital overnight.     Blood loss 20cc  Cardiac rehab consulted     Ken Goetz MD    D: 01/06/2023 13:13:18       T: 01/06/2023 13:43:28     [email or web address]  Job#: [id number]     Doc#: [id number]    CC:

## 2023-01-06 NOTE — PLAN OF CARE
Pt aware of plan for possible discharge tomorrow. Has been compliant with post-cath care plan so far this shift.

## 2023-01-06 NOTE — PLAN OF CARE
Report called to 3N to Napoleon Dominguez RN for patient to transfer. Patient is currently doing well and in good spirits. Vitals stable and family remains at bedside. TR band remains in place and no bleeding or hematoma noted along right radial site.  [facility] 1/6/2023

## 2023-01-06 NOTE — BRIEF OP NOTE
Brief Postoperative Note      Patient: Tam Graham  YOB: 1965  MRN: [id number]    Date of Procedure: 1/6/23    Pre-Op Diagnosis: CAD/NSTEMI    Post-Op Diagnosis: Same       Estimated Blood Loss (mL): Minimal    Complications: None    Findings:   DICTATED --[id number]  LEFT MAIN PATENT    LAD OSTIAL AND PROXIMAL 80% TO 0% TIA RESOLUTE 2.5X30 MM STENT AND POST DILATED WITH A 3.5X12 NC BALLOON     RAMUS OSTIAL AND DISTAL 80% TO 0% TIA RESOLUTE 2.5X15 AND 2.5X12MM STENT  SUPERIOR BRANCH OF RAMUS -BALLOON PTA-POBA -2.5X15 MM   LESION 80% TO 10%    LCX SMALL AND PATENT  RCA DISTAL 50% STENOSIS-DOMINANT AND ECTATIC VESSEL  LVEDP 15    RIGHT RADIAL APPROACH  NO COMPLICATIONS  ASA/PLAVIX AND HEPARIN   HOME TOMORROW  COREG AND COZZAR AND ASA AND PLAVIX AND STATINS      Electronically signed by Rajwinder Ford MD on 1/6/2023 at 1:05 PM

## 2023-01-06 NOTE — PROGRESS NOTES
Per [facility],[address] approved formulary policy. SGLT2's are only formulary with the indication of CKD or CHF therefore:    Please note that the  Empagliflozin Janna Cozier) is non-formulary with indications of type 2 diabetes and has been discontinued while inpatient. If you feel the patient needs to continue their home therapy during the inpatient stay, the patient may bring their medication bottle for verification and administration pursuant to our home medication use policy. Please contact the pharmacy with any questions or concerns. Thank you.   Joe Wellington, [facility], RPvirgil/PharmD 1/6/2023 2:39 PM

## 2023-01-06 NOTE — CONSULTS
Endocrinology   Consult Note  1/6/2023  9:30 AM     Primary Care provider: Leidy Ybarra     Referring physician:  Jacqueline Recio MD     Dear Doctor Edita Weller     Thank You for the Consult     Pt. Was Admitted for : Scherry Stamp, chest pain and shortness of breath    Reason for Consult: Better control of blood glucose      History Obtained From:  Patient/ EMR       HISTORY OF PRESENT ILLNESS:                The patient is a 61 y.o. male with significant past medical history of diabetes mellitus, hernia abdominal wall, hyperlipidemia, hypertension, sleep apnea on CPAP, has had backs surgery and shoulder surgery with rotator cuff repair medical hernia repair also has normal pressure hydrocephalus to the hospital for chest pain and also found to have SVT. Has had a stress test done that showed ischemia . Admitted to hospital for elective cardiac cath done today please see the detailed results of the cardiac cath below has LAD lesion therefore has angioplasty done this is fully. I was  consulted for better control of blood glucose. ROS:   Pt's ROS done in detail. Abnormal ROS are noted in Medical and Surgical History Section below:      Other Medical History:        Diagnosis Date    Diabetes mellitus ([address]     BORDERLINE    Hernia of abdominal wall     Hypercholesteremia     Hypertension     RADHA on CPAP     PONV (postoperative nausea and vomiting)     Pre-diabetes     Sleep apnea     wears cpap     Surgical History:        Procedure Laterality Date    BACK SURGERY  2015    [address]  02/2019    [facility]    CERVICAL SPINE SURGERY  05/2017    [address], [facility]OSCOPIC  2004    [facility]    COLONOSCOPY      due in 2020    NECK SURGERY  12/2015    [place]    NECK SURGERY  02/2019    [facility]    OTHER SURGICAL HISTORY Right 04/2018    I&D rotator cuff x2, [place]    ROTATOR CUFF REPAIR Left 06/2016    [facility]    ROTATOR CUFF REPAIR Right 03/2018    [facility]    UMBILICAL HERNIA REPAIR N/A 7/07/1747    UMBILICAL HERNIA REPAIR POSS MESH performed by Barbara Peterson MD at [facility]       Allergies:  Bee venom, Clindamycin/lincomycin, and Phenergan [promethazine]    Family History:       Problem Relation Age of Onset    Diabetes Mother     Kidney Disease Mother     Diabetes Father     Kidney Disease Father     Colon Cancer Father     Diabetes Sister     Diabetes Sister      REVIEW OF SYSTEMS:  Review of System Done as noted above     PHYSICAL EXAM:      Vitals:    /64   Pulse 57   Temp 97.6 °F (36.4 °C) (Oral)   Resp 17   Ht 6' 2\" (1.88 m)   Wt 272 lb 6.4 oz (123.6 kg)   SpO2 95%   BMI 34.97 kg/m²     CONSTITUTIONAL:  awake, alert, cooperative, appears stated age   EYES:  vision intact Fundoscopic Exam not performed   ENT:Normal  NECK:  Supple, No JVD. Thyroid Exam:Normal   LUNGS:  Has Vesicular Breath Sounds,   CARDIOVASCULAR:  Normal apical impulse, regular rate and rhythm, normal S1 and S2, no S3 or S4, and has no  murmur   ABDOMEN:  No scars, normal bowel sounds, soft, non-distended, non-tender, no masses palpated, no hepatolienomegaly  Musculoskeletal: Normal  Extremities: Normal, peripheral pulses normal, , has no edema   NEUROLOGIC:  Awake, alert, oriented to name, place and time. Cranial nerves II-XII are grossly intact. Motor is  intact.   Sensory neuropathy,  and gait is abnormal.    DATA:    CBC:   Recent Labs     01/06/23  1007   WBC 6.5   HGB 14.4       CMP:  Recent Labs     01/06/23  1007      K 4.6      CO2 27   BUN 17   CREATININE 0.7*   CALCIUM 8.9   PROT 7.1   LABALBU 4.1   BILITOT 0.2   ALKPHOS 101   AST 18   ALT 24     Lipids: No results found for: CHOL, HDL, TRIG  Glucose:   Recent Labs     01/06/23  1430   POCGLU 97     Hemoglobin A1C: No results found for: LABA1C  Free T4: No results found for: T4FREE  Free T3: No results found for: FT3  TSH High Sensitivity: No results found for: TSHHS    No orders to display          Scheduled Medicines   Medications:    citalopram  20 mg Oral Daily    carvedilol  6.25 mg Oral BID WC    losartan  50 mg Oral Daily    ranolazine  500 mg Oral BID    atorvastatin  80 mg Oral Nightly    sodium chloride flush  5-40 mL IntraVENous 2 times per day    [START ON 1/7/2023] aspirin  81 mg Oral Daily    [START ON 1/7/2023] clopidogrel  75 mg Oral Daily    insulin lispro  0-4 Units SubCUTAneous TID WC    insulin lispro  0-4 Units SubCUTAneous 2 times per day    empagliflozin  10 mg Oral Daily      Infusions:    sodium chloride      sodium chloride 75 mL/hr at 01/06/23 1456    sodium chloride      dextrose           IMPRESSION    Patient Active Problem List   Diagnosis    Status post angioplasty        CAD       Diabetes mellitus           Normal pressure hydrocephalus             RECOMMENDATIONS:      Reviewed POC blood glucose . Labs and X ray results   Reviewed Home and Current Medicines   Will Start On Correction bolus Humalog OHGD   Monitor Blood glucose frequently   Has persistent diarrhea with possibly due to metformin so that be discontinued  Modify  the dose of Insulin/ OHGD  as needed        Will follow with you  Again thank you for sharing pt's care with me.      Truly yours,       Nirali Parker MD

## 2023-01-06 NOTE — H&P
[address] [place] [address], [address] [place] [address]                              HISTORY AND PHYSICAL    PATIENT NAME: Mya Goldstein                   :        1965  MED REC NO:   [id number]                          ROOM:  ACCOUNT NO:   [de-identified]                           ADMIT DATE: 2023  PROVIDER:     Vivi Mccarty MD    INDICATIONS:  Nonsustained ventricular tachycardia, chest pain, and  shortness of breath. HISTORY OF PRESENT ILLNESS:  This is a 53-year-old male patient who was  up in the [facility]. He had been admitted there with having chest pain and  pressure present. He was [place] a casino and started feeling uneasiness. He went to the ER and he was found to have SVT. The patient received  adenosine x2 doses and then converted to the sinus rhythm. The patient  was ruled out for non-STEMI. The patient underwent a stress test and  stress test was negative for ischemia. The patient was placed on  low-dose metoprolol 12.5 mg b.i.d. The patient continued to have chest  pain and shortness of breath present and some heaviness in the chest  present. The patient was seen in the office yesterday and is scheduled  for heart catheterization today. A stress test in 2023 was negative for ischemia. LV function was  preserved. Echo in the office showed the LV function was preserved. PAST MEDICAL HISTORY:  He is having diabetes, hypertension, and  hyperlipidemia. He has been on statins for long term. Nonsustained  ventricular tachycardia, SVT, and non-STEMI. SOCIAL HISTORY:  He does not smoke. He does not drink. FAMILY HISTORY:  Significant for coronary artery disease present. Mother, brothers, and uncles, all have CAD and needing interventions.     MEDICATIONS AT HOME:  He is on aspirin 80 mg a day, Celexa 20 mg a day,  Forxiga 10 mg a day, Lipitor 80 mg a day, losartan 25 mg a day,  metformin 500 mg b.i.d., and Toprol 12.5 mg b.i.d. ALLERGIES:  Bee venom, PHENERGAN, and CLINDAMYCIN. PHYSICAL EXAMINATION:  GENERAL:  The patient is awake, alert, and answering questions, not in  acute distress. VITAL SIGNS:  Temperature is afebrile. Pulse is 50. Blood pressure is  132/90. HEENT:  Head is atraumatic. Pupils are equal and reactive. CHEST:  Equal expansion. LUNGS:  Clear to auscultation. No wheezing or rhonchi present. HEART:  Regular rhythm. ABDOMEN:  Soft and nontender. Bowel sounds are present. No  hepatosplenomegaly or guarding appreciated. EXTREMITIES:  No cyanosis noted. NEUROLOGIC:  Cranial nerves are grossly intact. IMPRESSION AND PLAN:  This is a 18-year-old male patient with history of  having hypertension, hyperlipidemia, diabetes, strong family history of  coronary artery disease and had non-STEMI, and arrhythmias present and  continued symptoms. In light of all this, the patient is here for heart catheterization. Possibly having bouts of ischemia present. Further recommendations will  be based on the heart catheterization.         Kirt Wright MD    D: 01/06/2023 10:02:28       T: 01/06/2023 13:27:16     [id number]  Job#: [id number]     Doc#: [id number]    CC:

## 2023-01-07 VITALS
HEART RATE: 60 BPM | BODY MASS INDEX: 34.96 KG/M2 | WEIGHT: 272.4 LBS | RESPIRATION RATE: 19 BRPM | OXYGEN SATURATION: 95 % | SYSTOLIC BLOOD PRESSURE: 130 MMHG | HEIGHT: 74 IN | DIASTOLIC BLOOD PRESSURE: 67 MMHG | TEMPERATURE: 98.3 F

## 2023-01-07 LAB
ANION GAP SERPL CALCULATED.3IONS-SCNC: 13 MMOL/L (ref 4–16)
BASOPHILS ABSOLUTE: 0 K/CU MM
BASOPHILS RELATIVE PERCENT: 0.2 % (ref 0–1)
BUN BLDV-MCNC: 14 MG/DL (ref 6–23)
CALCIUM SERPL-MCNC: 8.4 MG/DL (ref 8.3–10.6)
CHLORIDE BLD-SCNC: 103 MMOL/L (ref 99–110)
CO2: 23 MMOL/L (ref 21–32)
CREAT SERPL-MCNC: 0.7 MG/DL (ref 0.9–1.3)
DIFFERENTIAL TYPE: ABNORMAL
EKG ATRIAL RATE: 103 BPM
EKG ATRIAL RATE: 56 BPM
EKG DIAGNOSIS: NORMAL
EKG DIAGNOSIS: NORMAL
EKG P AXIS: 46 DEGREES
EKG P-R INTERVAL: 136 MS
EKG P-R INTERVAL: 148 MS
EKG Q-T INTERVAL: 348 MS
EKG Q-T INTERVAL: 420 MS
EKG QRS DURATION: 102 MS
EKG QRS DURATION: 98 MS
EKG QTC CALCULATION (BAZETT): 405 MS
EKG QTC CALCULATION (BAZETT): 455 MS
EKG R AXIS: 137 DEGREES
EKG R AXIS: 53 DEGREES
EKG T AXIS: 135 DEGREES
EKG T AXIS: 29 DEGREES
EKG VENTRICULAR RATE: 103 BPM
EKG VENTRICULAR RATE: 56 BPM
EOSINOPHILS ABSOLUTE: 0.3 K/CU MM
EOSINOPHILS RELATIVE PERCENT: 3.7 % (ref 0–3)
GFR SERPL CREATININE-BSD FRML MDRD: >60 ML/MIN/1.73M2
GLUCOSE BLD-MCNC: 119 MG/DL (ref 70–99)
GLUCOSE BLD-MCNC: 132 MG/DL (ref 70–99)
GLUCOSE BLD-MCNC: 133 MG/DL (ref 70–99)
GLUCOSE BLD-MCNC: 134 MG/DL (ref 70–99)
HCT VFR BLD CALC: 42.9 % (ref 42–52)
HEMOGLOBIN: 14 GM/DL (ref 13.5–18)
IMMATURE NEUTROPHIL %: 0.2 % (ref 0–0.43)
LYMPHOCYTES ABSOLUTE: 2.3 K/CU MM
LYMPHOCYTES RELATIVE PERCENT: 28.8 % (ref 24–44)
MCH RBC QN AUTO: 29.4 PG (ref 27–31)
MCHC RBC AUTO-ENTMCNC: 32.6 % (ref 32–36)
MCV RBC AUTO: 90.1 FL (ref 78–100)
MONOCYTES ABSOLUTE: 0.7 K/CU MM
MONOCYTES RELATIVE PERCENT: 8.1 % (ref 0–4)
NUCLEATED RBC %: 0 %
PDW BLD-RTO: 13.5 % (ref 11.7–14.9)
PLATELET # BLD: 278 K/CU MM (ref 140–440)
PMV BLD AUTO: 9.8 FL (ref 7.5–11.1)
POTASSIUM SERPL-SCNC: 4.4 MMOL/L (ref 3.5–5.1)
RBC # BLD: 4.76 M/CU MM (ref 4.6–6.2)
SEGMENTED NEUTROPHILS ABSOLUTE COUNT: 4.8 K/CU MM
SEGMENTED NEUTROPHILS RELATIVE PERCENT: 59 % (ref 36–66)
SODIUM BLD-SCNC: 139 MMOL/L (ref 135–145)
TOTAL IMMATURE NEUTOROPHIL: 0.02 K/CU MM
TOTAL NUCLEATED RBC: 0 K/CU MM
WBC # BLD: 8.1 K/CU MM (ref 4–10.5)

## 2023-01-07 PROCEDURE — 6370000000 HC RX 637 (ALT 250 FOR IP): Performed by: INTERNAL MEDICINE

## 2023-01-07 PROCEDURE — 93005 ELECTROCARDIOGRAM TRACING: CPT | Performed by: INTERNAL MEDICINE

## 2023-01-07 PROCEDURE — 82962 GLUCOSE BLOOD TEST: CPT

## 2023-01-07 PROCEDURE — 93010 ELECTROCARDIOGRAM REPORT: CPT | Performed by: INTERNAL MEDICINE

## 2023-01-07 PROCEDURE — 2580000003 HC RX 258: Performed by: INTERNAL MEDICINE

## 2023-01-07 PROCEDURE — 36415 COLL VENOUS BLD VENIPUNCTURE: CPT

## 2023-01-07 PROCEDURE — 85025 COMPLETE CBC W/AUTO DIFF WBC: CPT

## 2023-01-07 PROCEDURE — 80048 BASIC METABOLIC PNL TOTAL CA: CPT

## 2023-01-07 PROCEDURE — G0378 HOSPITAL OBSERVATION PER HR: HCPCS

## 2023-01-07 PROCEDURE — 94761 N-INVAS EAR/PLS OXIMETRY MLT: CPT

## 2023-01-07 PROCEDURE — 94660 CPAP INITIATION&MGMT: CPT

## 2023-01-07 RX ORDER — CLOPIDOGREL BISULFATE 75 MG/1
75 TABLET ORAL DAILY
Qty: 30 TABLET | Refills: 3 | Status: SHIPPED | OUTPATIENT
Start: 2023-01-08

## 2023-01-07 RX ORDER — LOSARTAN POTASSIUM 50 MG/1
50 TABLET ORAL DAILY
Qty: 30 TABLET | Refills: 3 | Status: SHIPPED | OUTPATIENT
Start: 2023-01-08

## 2023-01-07 RX ORDER — ASPIRIN 81 MG/1
81 TABLET, CHEWABLE ORAL DAILY
Qty: 30 TABLET | Refills: 3 | Status: SHIPPED | OUTPATIENT
Start: 2023-01-08

## 2023-01-07 RX ORDER — CARVEDILOL 6.25 MG/1
6.25 TABLET ORAL 2 TIMES DAILY WITH MEALS
Qty: 60 TABLET | Refills: 3 | Status: SHIPPED | OUTPATIENT
Start: 2023-01-07

## 2023-01-07 RX ORDER — ATORVASTATIN CALCIUM 80 MG/1
80 TABLET, FILM COATED ORAL NIGHTLY
Qty: 30 TABLET | Refills: 3 | Status: SHIPPED | OUTPATIENT
Start: 2023-01-07

## 2023-01-07 RX ORDER — RANOLAZINE 500 MG/1
500 TABLET, EXTENDED RELEASE ORAL 2 TIMES DAILY
Qty: 60 TABLET | Refills: 3 | Status: SHIPPED | OUTPATIENT
Start: 2023-01-07

## 2023-01-07 RX ADMIN — CLOPIDOGREL BISULFATE 75 MG: 75 TABLET ORAL at 07:46

## 2023-01-07 RX ADMIN — SODIUM CHLORIDE, PRESERVATIVE FREE 10 ML: 5 INJECTION INTRAVENOUS at 07:46

## 2023-01-07 RX ADMIN — ASPIRIN 81 MG: 81 TABLET, CHEWABLE ORAL at 07:46

## 2023-01-07 RX ADMIN — LOSARTAN POTASSIUM 50 MG: 25 TABLET, FILM COATED ORAL at 07:46

## 2023-01-07 RX ADMIN — EMPAGLIFLOZIN 10 MG: 10 TABLET, FILM COATED ORAL at 07:46

## 2023-01-07 RX ADMIN — CITALOPRAM HYDROBROMIDE 20 MG: 20 TABLET ORAL at 07:46

## 2023-01-07 RX ADMIN — RANOLAZINE 500 MG: 500 TABLET, EXTENDED RELEASE ORAL at 07:46

## 2023-01-07 RX ADMIN — SODIUM CHLORIDE: 9 INJECTION, SOLUTION INTRAVENOUS at 03:36

## 2023-01-07 RX ADMIN — CARVEDILOL 6.25 MG: 6.25 TABLET, FILM COATED ORAL at 07:46

## 2023-01-07 NOTE — PROGRESS NOTES
Progress Note( Dr. Janes Andrade)  1/7/2023  Subjective:   Admit Date: 1/6/2023  PCP: Kathrine Boyce    Admitted For :   : V. tach, chest pain and shortness of breath    Consulted For: : Better control of blood glucose    Interval History: Feels much better this morning    Denies any chest pains,   Denies SOB . Denies nausea or vomiting. No new bowel or bladder symptoms.        Intake/Output Summary (Last 24 hours) at 1/7/2023 1304  Last data filed at 1/7/2023 0744  Gross per 24 hour   Intake 1163.06 ml   Output --   Net 1163.06 ml       DATA    CBC:   Recent Labs     01/06/23  1007 01/07/23  0641   WBC 6.5 8.1   HGB 14.4 14.0    278    CMP:  Recent Labs     01/06/23  1007 01/07/23  0641    139   K 4.6 4.4    103   CO2 27 23   BUN 17 14   CREATININE 0.7* 0.7*   CALCIUM 8.9 8.4   PROT 7.1  --    LABALBU 4.1  --    BILITOT 0.2  --    ALKPHOS 101  --    AST 18  --    ALT 24  --      Lipids: No results found for: CHOL, HDL, TRIG  Glucose:  Recent Labs     01/07/23  0243 01/07/23  0729 01/07/23  1120   POCGLU 134* 133* 132*     [id number]:  Lab Results   Component Value Date/Time    LABA1C 6.8 01/06/2023 08:58 PM     High Sensitivity TSH: No results found for: TSHHS  Free T3: No results found for: FT3  Free T4:No results found for: T4FREE    No orders to display        Scheduled Medicines   Medications:    citalopram  20 mg Oral Daily    carvedilol  6.25 mg Oral BID WC    losartan  50 mg Oral Daily    ranolazine  500 mg Oral BID    atorvastatin  80 mg Oral Nightly    sodium chloride flush  5-40 mL IntraVENous 2 times per day    aspirin  81 mg Oral Daily    clopidogrel  75 mg Oral Daily    insulin lispro  0-4 Units SubCUTAneous TID WC    insulin lispro  0-4 Units SubCUTAneous 2 times per day    empagliflozin  10 mg Oral Daily      Infusions:    sodium chloride Stopped (01/07/23 1142)    sodium chloride Stopped (01/07/23 1143)    sodium chloride      dextrose           Objective:   Vitals: /67 Pulse 60   Temp 98.3 °F (36.8 °C) (Oral)   Resp 19   Ht 6' 2\" (1.88 m)   Wt 272 lb 6.4 oz (123.6 kg)   SpO2 95%   BMI 34.97 kg/m²   General appearance: alert and cooperative with exam  Neck: no JVD or bruit  Thyroid : Normal lobes   Lungs: Has Vesicular Breath sounds   Heart:  regular rate and rhythm  Abdomen: soft, non-tender; bowel sounds normal; no masses,  no organomegaly  Musculoskeletal: Normal  Extremities: extremities normal, , no edema  Neurologic:  Awake, alert, oriented to name, place and time. Cranial nerves II-XII are grossly intact. Motor is  intact. Sensory neuropathy,  and gait is normal.    Assessment:     Patient Active Problem List:     Status post angioplasty      Plan:     Reviewed POC blood glucose . Labs and X ray results   Reviewed Current Medicines   On Correction bolus Humalog/ Basal Lantus Insulin regime / and Oral Hypoglycemic drugs   Monitor Blood glucose frequently   Modified  the dose of Insulin/ other medicines as needed   Will follow in the office upon discharge    .      Peace Montero MD, MD

## 2023-01-07 NOTE — DISCHARGE SUMMARY
Discharge Summary    Evelio Brown  :  1965  MRN:  [id number]    Admit date:  2023  Discharge date:      Admitting Physician:  Olive Beal MD    Discharge Diagnoses:    Patient Active Problem List   Diagnosis    Status post angioplasty       Admission Condition:  good    Discharged Condition:  good    Hospital Course:   Evelio Brown is a 61 y.o. male with a past medical history significant for HTN, diabetes, hyperlipidemia, NSVT and non-STEMI. Patient was seen in the office for follow up for continued chest pain and shortness of breath after discharge from [facility] after negative cardiac stress test. The patient was then admitted for cardia catheterization by Dr. Olive Beal. RCA was noted to have 50% disease, Ramus with 80% stenosis, LAD proximal 80% stenosis followed by mid 80% stenosis. Patient then underwent PCI with stent placement to LAD and Ramus with no complications. The patient is doing well this morning with no complications. The cath site is clean, dry, and intact. No hematoma or edema noted. The patient will be discharged home to follow-up with Dr. Olive Beal next week outpatient.         Addressing Aspirin:  Continue daily aspirin    Discharge Medications:          Medication List        START taking these medications      aspirin 81 MG chewable tablet  Take 1 tablet by mouth daily  Start taking on: 2023     carvedilol 6.25 MG tablet  Commonly known as: COREG  Take 1 tablet by mouth 2 times daily (with meals)     clopidogrel 75 MG tablet  Commonly known as: PLAVIX  Take 1 tablet by mouth daily  Start taking on: 2023     ranolazine 500 MG extended release tablet  Commonly known as: RANEXA  Take 1 tablet by mouth 2 times daily            CHANGE how you take these medications      atorvastatin 80 MG tablet  Commonly known as: LIPITOR  Take 1 tablet by mouth nightly  What changed:   medication strength  how much to take     losartan 50 MG tablet  Commonly known as: COZAAR  Take 1 tablet by mouth daily  Start taking on: January 8, 2023  What changed:   medication strength  how much to take            CONTINUE taking these medications      citalopram 20 MG tablet  Commonly known as: CELEXA     Farxiga 10 MG tablet  Generic drug: dapagliflozin            STOP taking these medications      ketorolac 10 MG tablet  Commonly known as: TORADOL     lisinopril 5 MG tablet  Commonly known as: PRINIVIL;ZESTRIL     metFORMIN 500 MG extended release tablet  Commonly known as: GLUCOPHAGE-XR     metoprolol tartrate 25 MG tablet  Commonly known as: LOPRESSOR               Where to Get Your Medications        You can get these medications from any pharmacy    Bring a paper prescription for each of these medications  aspirin 81 MG chewable tablet  atorvastatin 80 MG tablet  carvedilol 6.25 MG tablet  clopidogrel 75 MG tablet  losartan 50 MG tablet  ranolazine 500 MG extended release tablet         Consults: Cardiac rehab,  Endocrinology     Patient to follow up with endocrinology in office in 2  weeks       Disposition:   home    Condition at discharge: Pt was medically stable at the time of discharge. Activity: Do not participate in strenuous activities for 2 days after the procedure     Total time taken for discharging this patient: 40 minutes. Greater than 70% of time was spent focused exclusively on this patient. Time was taken to review chart, discuss plans with consultants, reconciling medications, discussing plan answering questions with patient.      Signed:  Electronically signed by KRYSTYNA Garcia CNP on 1/7/2023 at 11:18 AM   Patient seen and examined agree with above assessment and plan  Electronically signed by Kiera Heath MD on 1/7/2023 at 11:50 AM

## 2023-01-07 NOTE — DISCHARGE INSTRUCTIONS
Patient Teaching   1. Avoid flexion and extension and lifting objects weighing more than 5 lb with the affected wrist for 5 days. 2. Keep the occlusive dressing in place for 24 hours and to remove it after his first shower. 3. Avoid submerging his affected wrist in water for 3 days. 4. Local swelling of the wrist can be expected;  apply ice and to take an analgesic for relief. 5. Monitor the insertion site for infection which are redness, warmth, swelling, drainage and or fever. Inform your Doctor immediately. 6. If bleeding or a hematoma (a hard bruise) should occur hold pressure and come into the ER.

## 2023-01-07 NOTE — PLAN OF CARE
Problem: Chronic Conditions and Co-morbidities  Goal: Patient's chronic conditions and co-morbidity symptoms are monitored and maintained or improved  1/7/2023 0236 by Apolonia Jackson RN  Outcome: Progressing  1/6/2023 1732 by Darlene Hong RN  Outcome: Progressing  Flowsheets (Taken 1/6/2023 1430)  Care Plan - Patient's Chronic Conditions and Co-Morbidity Symptoms are Monitored and Maintained or Improved:   Monitor and assess patient's chronic conditions and comorbid symptoms for stability, deterioration, or improvement   Collaborate with multidisciplinary team to address chronic and comorbid conditions and prevent exacerbation or deterioration   Update acute care plan with appropriate goals if chronic or comorbid symptoms are exacerbated and prevent overall improvement and discharge     Problem: Discharge Planning  Goal: Discharge to home or other facility with appropriate resources  1/7/2023 0236 by Apolonia Jackson RN  Outcome: Progressing  1/6/2023 1732 by Darlene Hong RN  Outcome: Progressing  Flowsheets (Taken 1/6/2023 1430)  Discharge to home or other facility with appropriate resources:   Identify barriers to discharge with patient and caregiver   Identify discharge learning needs (meds, wound care, etc)     Problem: Safety - Adult  Goal: Free from fall injury  1/7/2023 0236 by Apolonia Jackson RN  Outcome: Progressing  1/6/2023 1732 by Darlene Hong RN  Outcome: Progressing    Electronically signed by Apolonia Jackson RN on 1/7/23 at 2:36 AM EST

## (undated) DEVICE — SHEET, T, LAPAROTOMY, STERILE: Brand: MEDLINE

## (undated) DEVICE — BINDER ABD 2XL H12XL60 75IN UNISX STD E 4 PNL DISPOSABLE

## (undated) DEVICE — PACK PROCEDURE SURG PLAS SC MIN SRHP LF

## (undated) DEVICE — TUBING, SUCTION, 1/4" X 12', STRAIGHT: Brand: MEDLINE

## (undated) DEVICE — HYPODERMIC SAFETY NEEDLE: Brand: MAGELLAN

## (undated) DEVICE — INTENDED FOR TISSUE SEPARATION, AND OTHER PROCEDURES THAT REQUIRE A SHARP SURGICAL BLADE TO PUNCTURE OR CUT.: Brand: BARD-PARKER ® CARBON RIB-BACK BLADES

## (undated) DEVICE — GLOVE SURG SZ 75 L12IN FNGR THK94MIL STD WHT ISOLEX LTX

## (undated) DEVICE — BLADE CLIPPER GEN PURP NS

## (undated) DEVICE — STRIP,CLOSURE,WOUND,MEDI-STRIP,1/2X4: Brand: MEDLINE

## (undated) DEVICE — CHLORAPREP 26ML ORANGE

## (undated) DEVICE — SOLUTION IV 1000ML 0.9% SOD CHL PH 5 INJ USP VIAFLX PLAS

## (undated) DEVICE — YANKAUER,BULB TIP,W/O VENT,RIGID,STERILE: Brand: MEDLINE